# Patient Record
Sex: FEMALE | Race: WHITE | NOT HISPANIC OR LATINO | Employment: OTHER | URBAN - METROPOLITAN AREA
[De-identification: names, ages, dates, MRNs, and addresses within clinical notes are randomized per-mention and may not be internally consistent; named-entity substitution may affect disease eponyms.]

---

## 2023-02-22 ENCOUNTER — HOSPITAL ENCOUNTER (EMERGENCY)
Facility: CLINIC | Age: 28
Discharge: HOME | End: 2023-02-23
Attending: EMERGENCY MEDICINE | Admitting: EMERGENCY MEDICINE
Payer: COMMERCIAL

## 2023-02-22 DIAGNOSIS — Z48.89 ENCOUNTER FOR POST SURGICAL WOUND CHECK: ICD-10-CM

## 2023-02-22 PROCEDURE — 99283 EMERGENCY DEPT VISIT LOW MDM: CPT | Performed by: EMERGENCY MEDICINE

## 2023-02-22 PROCEDURE — 99282 EMERGENCY DEPT VISIT SF MDM: CPT | Performed by: EMERGENCY MEDICINE

## 2023-02-23 VITALS
HEIGHT: 62 IN | SYSTOLIC BLOOD PRESSURE: 139 MMHG | WEIGHT: 175 LBS | OXYGEN SATURATION: 96 % | TEMPERATURE: 99 F | BODY MASS INDEX: 32.2 KG/M2 | RESPIRATION RATE: 16 BRPM | HEART RATE: 90 BPM | DIASTOLIC BLOOD PRESSURE: 84 MMHG

## 2023-02-23 NOTE — DISCHARGE INSTRUCTIONS
Gently clean the surgical site twice a day with soap and water. Be sure to thoroughly dry the area after cleaning.    Monitor for any signs of infection which would include increasing redness, swelling, drainage, warmth, or increasing pain.  If such symptoms develop, please return for a wound check to assess for infection.

## 2023-02-23 NOTE — ED NOTES
There is along the middle of the  line a white spot. Bashir reports that she has been having the last 2 days white and clear discharge from the wound. There has been no reported fevers. No SOB. No reported Urinary symptoms. No abnormal vaginal discharge.

## 2023-02-24 ASSESSMENT — ENCOUNTER SYMPTOMS
FATIGUE: 0
APPETITE CHANGE: 0
LIGHT-HEADEDNESS: 0
VOMITING: 0
DIARRHEA: 0
SHORTNESS OF BREATH: 0
ABDOMINAL PAIN: 0
WOUND: 1
HEADACHES: 0
NAUSEA: 0
COUGH: 0
FEVER: 0
CHILLS: 0
CONSTIPATION: 0

## 2023-02-24 NOTE — ED PROVIDER NOTES
History     Chief Complaint   Patient presents with     Wound Infection     White drainage from  line.      HPI  Bashir Mcwilliams is a 28 year old female who is about 2 weeks postpartum from  presenting for for evaluation of possible surgical site infection.  Patient reports some abnormal itchiness and irritation around her incision site over the past day or so.  She noticed some slight drainage tonight and was concerned so came in for evaluation.  Denies any significant pain.  Denies fevers or chills.  Eating and drinking normally.  Normal bowel movements and urination.  Denies dysuria, urgency, or frequency.    Allergies:  Allergies   Allergen Reactions     Penicillins Hives       Problem List:    Patient Active Problem List    Diagnosis Date Noted     Foot injury 2013     Priority: Medium     Migraine 2013     Priority: Medium     Depressed 2013     Priority: Medium     Allergic rhinitis 2006     Priority: Medium     Problem list name updated by automated process. Provider to review       Attention deficit disorder 2006     Priority: Medium     Problem list name updated by automated process. Provider to review          Past Medical History:    Past Medical History:   Diagnosis Date     Anxiety      Depression      Migraine        Past Surgical History:    Past Surgical History:   Procedure Laterality Date     [facility] NONSPECIFIC PROCEDURE      liver hemagioma resection 3/ liver gone       Family History:    Family History   Problem Relation Age of Onset     Depression Mother         bipolar     Depression Brother         11 yr old with adhd     Asthma Mother      Diabetes Father      C.A.D. No family hx of      Hypertension No family hx of        Social History:  Marital Status:  Single [1]  Social History     Tobacco Use     Smoking status: Never   Substance Use Topics     Alcohol use: Yes     Comment: States last use was more than a year ago     Drug use: Yes      "Types: Marijuana     Comment: Last use more than a month ago        Medications:    citalopram (CELEXA) 10 MG tablet  STRATTERA 40 MG OR CAPS          Review of Systems   Constitutional: Negative for appetite change, chills, fatigue and fever.   Respiratory: Negative for cough and shortness of breath.    Cardiovascular: Negative for chest pain.   Gastrointestinal: Negative for abdominal pain, constipation, diarrhea, nausea and vomiting.        Pain in lower abdominal wall at site of incision   Genitourinary: Positive for vaginal bleeding (Slight spotting). Negative for decreased urine volume.   Skin: Positive for wound (Surgical incision lower abdominal wall).   Neurological: Negative for light-headedness and headaches.   All other systems reviewed and are negative.      Physical Exam   BP: 139/84  Pulse: 90  Temp: 99  F (37.2  C)  Resp: 16  Height: 157.5 cm (5' 2\")  Weight: 79.4 kg (175 lb)  SpO2: 96 %      Physical Exam  Vitals and nursing note reviewed.   HENT:      Head: Atraumatic.   Eyes:      Conjunctiva/sclera: Conjunctivae normal.   Cardiovascular:      Rate and Rhythm: Normal rate.   Pulmonary:      Effort: Pulmonary effort is normal.   Abdominal:       Skin:     General: Skin is warm and dry.      Capillary Refill: Capillary refill takes less than 2 seconds.   Neurological:      Mental Status: She is alert and oriented to person, place, and time.   Psychiatric:         Mood and Affect: Mood normal.             ED Course                 Procedures             No results found for this or any previous visit (from the past 24 hour(s)).    Medications - No data to display    Assessments & Plan (with Medical Decision Making)  28-year-old female who is about 2 weeks postpartum from a  presenting for evaluation of a possible wound infection.  Patient is well-appearing.  Afebrile.  Wound was cleaned by myself with a wet gauze.  Some small residual scab was wiped away and cleaned.  Incision appears well " healing with no evidence of abscess or cellulitis.  In the center of the incision there is a small area of increased moisture.  No active drainage.  I do not believe there is an active infection in the area.  Patient advised of the need to keep the wound clean and dry.  Plan to follow-up with her OB and return if she develops any worsening symptoms     I have reviewed the nursing notes.    I have reviewed the findings, diagnosis, plan and need for follow up with the patient.             Discharge Medication List as of 2/23/2023 12:31 AM          Final diagnoses:   Encounter for post surgical wound check       2/22/2023   [facility] EMERGENCY DEPT     Johnson, Wes Morales MD  02/24/23 3276

## 2023-05-29 ENCOUNTER — HOSPITAL ENCOUNTER (EMERGENCY)
Facility: CLINIC | Age: 28
Discharge: HOME | End: 2023-05-29
Attending: EMERGENCY MEDICINE | Admitting: EMERGENCY MEDICINE
Payer: COMMERCIAL

## 2023-05-29 ENCOUNTER — APPOINTMENT (OUTPATIENT)
Dept: GENERAL RADIOLOGY | Facility: CLINIC | Age: 28
End: 2023-05-29
Attending: EMERGENCY MEDICINE
Payer: COMMERCIAL

## 2023-05-29 VITALS
OXYGEN SATURATION: 100 % | TEMPERATURE: 98.2 F | WEIGHT: 258 LBS | BODY MASS INDEX: 45.71 KG/M2 | DIASTOLIC BLOOD PRESSURE: 80 MMHG | SYSTOLIC BLOOD PRESSURE: 125 MMHG | HEIGHT: 63 IN | RESPIRATION RATE: 18 BRPM | HEART RATE: 75 BPM

## 2023-05-29 DIAGNOSIS — M89.9 OSTEOCHONDRAL TALAR DOME LESION: ICD-10-CM

## 2023-05-29 DIAGNOSIS — S66.912A WRIST STRAIN, LEFT, INITIAL ENCOUNTER: ICD-10-CM
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
## 2023-05-29 DIAGNOSIS — M94.9 OSTEOCHONDRAL TALAR DOME LESION: ICD-10-CM

## 2023-05-29 DIAGNOSIS — S93.402A SPRAIN OF LEFT ANKLE, UNSPECIFIED LIGAMENT, INITIAL ENCOUNTER: ICD-10-CM

## 2023-05-29 DIAGNOSIS — S96.912A STRAIN OF LEFT FOOT, INITIAL ENCOUNTER: ICD-10-CM

## 2023-05-29 PROCEDURE — 73630 X-RAY EXAM OF FOOT: CPT | Mod: LT

## 2023-05-29 PROCEDURE — 99285 EMERGENCY DEPT VISIT HI MDM: CPT

## 2023-05-29 PROCEDURE — 73610 X-RAY EXAM OF ANKLE: CPT | Mod: LT

## 2023-05-29 PROCEDURE — 73110 X-RAY EXAM OF WRIST: CPT | Mod: LT

## 2023-05-29 PROCEDURE — 99284 EMERGENCY DEPT VISIT MOD MDM: CPT | Performed by: EMERGENCY MEDICINE

## 2023-05-29 ASSESSMENT — ACTIVITIES OF DAILY LIVING (ADL)
ADLS_ACUITY_SCORE: 35
ADLS_ACUITY_SCORE: 37

## 2023-05-29 NOTE — DISCHARGE INSTRUCTIONS
Return if symptoms worsen or new symptoms develop.  Follow-up with orthopedics next available.  Drink plenty of fluids.  Use crutches as needed do not weight bear weight if pain Continues take ibuprofen or Tylenol for pain elevate wrist and ankle is much as possible.  If increased pain numbness weakness occur please return for recheck.  They should call you to make an orthopedic appointment.

## 2023-05-29 NOTE — ED TRIAGE NOTES
Pt reports she fell on the [place], pt reports she thinks her right knee gave out, rolled her left ankle and caught herself with her left hand. Pt reports pain to left wrist, left knee and left ankle. Pt denies hitting her head or LOC at this time

## 2023-05-29 NOTE — ED PROVIDER NOTES
History     Chief Complaint   Patient presents with     Fall     Pt reports she fell on the [place], pt reports she thinks her right knee gave out, rolled her left ankle and caught herself with her left hand. Pt reports pain to left wrist, left knee and left ankle. Pt denies hitting her head or LOC at this time      HPI  Bashir Mcwilliams is a 28 year old female with past medical history significant for attention deficit disorder migraines foot injury depression who presents emergency department complaining of left wrist foot and ankle pain status post fall.  Patient states she was standing in her right leg suddenly gave out on her.  Patient has which she describes as a faulty knee this is happened in the past.  She fell down to the ground twisting her ankle and foot and landing on outstretched hands.  She did not hit her head she denies any neck or back pain has pain in her left wrist region left ankle and foot pain.  She is able to move the ankle but has pain pain is in the proximal dorsal foot and the lateral malleoli region of the ankle.  Has pain with movement of the left wrist but has able to move the wrist no obvious deformity noted no elbow or shoulder pain on the left.    Allergies:  Allergies   Allergen Reactions     No Clinical Screening - See Comments Difficulty breathing     Tide Sport Febreze     Penicillins Hives       Problem List:    Patient Active Problem List    Diagnosis Date Noted     Foot injury 01/19/2013     Priority: Medium     Migraine 01/17/2013     Priority: Medium     Depressed 01/16/2013     Priority: Medium     Allergic rhinitis 12/12/2006     Priority: Medium     Problem list name updated by automated process. Provider to review       Attention deficit disorder 12/12/2006     Priority: Medium     Problem list name updated by automated process. Provider to review          Past Medical History:    Past Medical History:   Diagnosis Date     Anxiety      Depression      Migraine   "      Past Surgical History:    Past Surgical History:   Procedure Laterality Date     [facility] NONSPECIFIC PROCEDURE      liver hemagioma resection 3/4 liver gone       Family History:    Family History   Problem Relation Age of Onset     Depression Mother         bipolar     Depression Brother         11 yr old with adhd     Asthma Mother      Diabetes Father      C.A.D. No family hx of      Hypertension No family hx of        Social History:  Marital Status:  Single [1]  Social History     Tobacco Use     Smoking status: Never   Substance Use Topics     Alcohol use: Yes     Comment: States last use was more than a year ago     Drug use: Yes     Types: Marijuana     Comment: Last use more than a month ago        Medications:    citalopram (CELEXA) 10 MG tablet  STRATTERA 40 MG OR CAPS          Review of Systems  All systems reviewed and other than pertinent positives and negatives in HPI all other systems are negative.  Physical Exam   BP: 125/80  Pulse: 75  Temp: 98.2  F (36.8  C)  Resp: 18  Height: 160 cm (5' 3\")  Weight: 117 kg (258 lb)  SpO2: 99 %      Physical Exam  Vitals and nursing note reviewed.   Constitutional:       General: She is not in acute distress.     Appearance: Normal appearance. She is not ill-appearing, toxic-appearing or diaphoretic.   HENT:      Head: Normocephalic and atraumatic.      Nose: Nose normal.      Mouth/Throat:      Mouth: Mucous membranes are moist.      Pharynx: Oropharynx is clear.   Eyes:      Conjunctiva/sclera: Conjunctivae normal.   Neck:      Comments: No posterior neck tenderness is present.  Cardiovascular:      Rate and Rhythm: Normal rate and regular rhythm.      Pulses: Normal pulses.      Heart sounds: Normal heart sounds. No murmur heard.  Pulmonary:      Effort: Pulmonary effort is normal.      Breath sounds: Normal breath sounds. No stridor. No wheezing or rhonchi.   Abdominal:      General: Abdomen is flat. There is no distension.      Palpations: Abdomen is soft. "      Tenderness: There is no abdominal tenderness. There is no right CVA tenderness or left CVA tenderness.   Musculoskeletal:      Comments: There is no midline back pain present.  There is tenderness to palpation over the left wrist region.  No significant swelling pain is mostly in the proximal wrist distal and the dorsum region palpation reproduces pain.  Patient able to flex and extend the fingers without difficulty sensation intact good capillary refill.  There is no hip tenderness present.  There is tenderness palpation of the lateral malleolus and dorsal proximal foot region laterally.  Palpation reproduces his pain.  No obvious laxity pulses sensation symmetrical good capillary refill is present   Skin:     General: Skin is warm and dry.      Capillary Refill: Capillary refill takes less than 2 seconds.      Findings: No rash.   Neurological:      General: No focal deficit present.      Mental Status: She is alert and oriented to person, place, and time.      Sensory: No sensory deficit.      Motor: No weakness.      Coordination: Coordination normal.   Psychiatric:         Mood and Affect: Mood normal.         ED Course                 Procedures              Critical Care time:  none               Results for orders placed or performed during the hospital encounter of 05/29/23 (from the past 24 hour(s))   Foot XR, G/E 3 views, left    Narrative    EXAM: XR FOOT LEFT G/E 3 VIEWS  LOCATION: [facility]  DATE/TIME: 5/29/2023 4:09 PM CDT    INDICATION: Fall, left proximal dorsal foot pain.  COMPARISON: None.      Impression    IMPRESSION:   1. Chronic-appearing medial corner talar dome osteochondral lesion.  2. Normal otherwise. No evidence of foot fracture or malalignment.   XR Wrist Left G/E 3 Views    Narrative    EXAM: XR WRIST LEFT G/E 3 VIEWS  LOCATION: [facility]  DATE/TIME: 5/29/2023 4:10 PM CDT    INDICATION: Fall left wrist pain.  COMPARISON:  None.      Impression    IMPRESSION: Normal joint spaces and alignment. No fracture.   XR Ankle Left G/E 3 Views    Narrative    EXAM: XR ANKLE LEFT G/E 3 VIEWS  LOCATION: [facility]  DATE/TIME: 5/29/2023 4:10 PM CDT    INDICATION: Fall, left ankle pain.  COMPARISON: None.      Impression    IMPRESSION: There is a moderate-sized chronic-appearing medial corner talar dome osteochondral lesion. MRI could help determine whether not this is unstable. The bones appear normal otherwise. Normal joint spacing and alignment. There is no evidence of   an acute or subacute fracture.       Medications - No data to display    Assessments & Plan (with Medical Decision Making) records were reviewed.  Past medical history allergies and medications were reviewed.  Office visit from 3/16/2023 was reviewed.  Due to the fall x-rays of the left wrist left ankle and foot were obtained.  I independently reviewed and interpreted the x-rays. I agrees with the radiologist findings.  Right wrist film revealed normal joint space and alignment no fracture seen.  Left ankle x-ray there is no evidence of acute or subacute fracture moderate size chronic appearing medial corner talar dome osteochondral lesion recommended further follow-up with possible MRI scan.  No evidence of foot fracture or malalignment are seen.  Findings discussed with patient she has having pain with ambulation and at this time I Britt place the patient in air splint and have her use crutches with weightbearing as tolerated.  I am going to have her her follow-up with orthopedics for further assessment of the talar lesion and need for MRI.  Patient feels comfortable with this plan at this time.  She will elevate and ice and take ibuprofen or Tylenol for pain if worsening pain numbness weakness she should return for recheck.     I have reviewed the nursing notes.    I have reviewed the findings, diagnosis, plan and need for follow up with the  patient.         Discharge Medication List as of 5/29/2023  5:17 PM          Final diagnoses:   Fall, initial encounter   Wrist strain, left, initial encounter   Sprain of left ankle, unspecified ligament, initial encounter   Strain of left foot, initial encounter   Osteochondral talar dome lesion       5/29/2023   [facility] EMERGENCY DEPT     Sanchez Amin MD  05/30/23 1148

## 2024-12-31 ENCOUNTER — HOSPITAL ENCOUNTER (EMERGENCY)
Facility: CLINIC | Age: 29
Discharge: HOME | End: 2024-12-31
Payer: COMMERCIAL

## 2024-12-31 VITALS
WEIGHT: 249 LBS | SYSTOLIC BLOOD PRESSURE: 124 MMHG | RESPIRATION RATE: 18 BRPM | HEART RATE: 96 BPM | DIASTOLIC BLOOD PRESSURE: 84 MMHG | BODY MASS INDEX: 44.11 KG/M2 | TEMPERATURE: 99.8 F | OXYGEN SATURATION: 100 %

## 2024-12-31 DIAGNOSIS — J35.8 TONSILLAR EXUDATE: Primary | ICD-10-CM

## 2024-12-31 PROBLEM — G89.29 CHRONIC RIGHT-SIDED THORACIC BACK PAIN: Status: ACTIVE | Noted: 2019-02-18

## 2024-12-31 PROBLEM — F31.81 BIPOLAR II DISORDER, MILD, DEPRESSED, WITH ANXIOUS DISTRESS (H): Status: ACTIVE | Noted: 2023-07-20

## 2024-12-31 PROBLEM — O09.90 SUPERVISION OF HIGH-RISK PREGNANCY: Status: ACTIVE | Noted: 2022-11-29

## 2024-12-31 PROBLEM — O13.9 GESTATIONAL HYPERTENSION AFFECTING SECOND PREGNANCY: Status: ACTIVE | Noted: 2023-01-10

## 2024-12-31 PROBLEM — F43.10 PTSD (POST-TRAUMATIC STRESS DISORDER): Status: ACTIVE | Noted: 2023-07-20

## 2024-12-31 PROBLEM — M54.6 CHRONIC RIGHT-SIDED THORACIC BACK PAIN: Status: ACTIVE | Noted: 2019-02-18

## 2024-12-31 PROBLEM — O99.212 MATERNAL MORBID OBESITY IN SECOND TRIMESTER, ANTEPARTUM (H): Status: ACTIVE | Noted: 2022-09-14

## 2024-12-31 PROBLEM — E66.01 MATERNAL MORBID OBESITY IN SECOND TRIMESTER, ANTEPARTUM (H): Status: ACTIVE | Noted: 2022-09-14

## 2024-12-31 LAB — S PYO DNA THROAT QL NAA+PROBE: NOT DETECTED

## 2024-12-31 PROCEDURE — 87651 STREP A DNA AMP PROBE: CPT

## 2024-12-31 PROCEDURE — 99283 EMERGENCY DEPT VISIT LOW MDM: CPT

## 2024-12-31 ASSESSMENT — ACTIVITIES OF DAILY LIVING (ADL): ADLS_ACUITY_SCORE: 41

## 2025-01-01 NOTE — ED TRIAGE NOTES
Pt comes in with white spots on her throat. States its either thrush or tonsillitis and that she did cough up a tonsil stone the other day.

## 2025-01-01 NOTE — ED PROVIDER NOTES
[facility]  Emergency Department Visit Note    PATIENT:  Bashir Mcwilliams     29 year old     female      [id number]    Chief complaint:  Chief Complaint   Patient presents with    Pharyngitis          History of present illness:  Patient is a 29 year old female with bipolar 2, GERD, PTSD presenting for evaluation of white coloration of left tonsil.    Had tonsil stones 1 week ago.  Her fiancé states he noticed the white coloration over her left tonsils yesterday, she noticed it this morning.  Nothing on the right side.  She does not feel like she is having significant throat pain, perhaps had some mild throat pain that improved with oral hydration in triage.  No fevers, cough, chest pain, dyspnea.      Review of Systems:  As in HPI above    /84   Pulse 96   Temp 99.8  F (37.7  C) (Tympanic)   Resp 18   Wt 112.9 kg (249 lb)   SpO2 100%   BMI 44.11 kg/m        Physical Exam:  Constitutional: laying in hospital bed, alert, oriented, and in no apparent distress  HEENT: normocephalic, atraumatic, sclerae anicteric, moist mucous membranes, and uvula midline, tongue not elevated, no sublingual firmness or tenderness.  Does have left tonsillar pillar exudates, no right-sided exudates.  TMs clear bilaterally.  Neck: able to fully range, no stridor, and no submandibular swelling, discoloration, or overlying skin change  Cardiovascular: regular rate and rhythm  Pulmonary: breathing comfortably on room air  Extremities/MSK: no cyanosis   Skin: warm, dry  Neurologic: moves all four extremities spontaneously and ambulates without assistance  Psychiatric: calm, appropriate      MDM:  Patient is a 29 year old female with above history presenting for evaluation of tonsillar exudate.    Vitals normal, afebrile. Exam reassuring, appears acutely well, left-sided tonsillar pillar exudate.    Differential includes tonsillolith.  Had group A strep swab negative in triage.  Viral pharyngitis possible though  not having note where the pain.  No evidence of deeper infectious process.    Recommended monitoring over the next few days.  Salt water gargling.  Acetaminophen Dibrom for pain and fevers as they arrive.  ED return precaution discussed event of new or worsening symptoms.  She expressed understanding agreement with this.    Disposition discharge. Remainder of ED course below.    ED COURSE:       Encounter Diagnoses:  Final diagnoses:   Tonsillar exudate       Final disposition: discharge    Fam Pena MD  12/31/2024  9:42 PM   Emergency Medicine  [facility]      Fam Pena MD  12/31/24 [id number]

## 2025-01-01 NOTE — DISCHARGE INSTRUCTIONS
You were seen in the emergency department today for white discoloration of your tonsil. We did tests including strep test that showed that you are negative for strep throat.  There are a couple of options as to what is causing your symptoms including tonsil stones as well as a viral sore throat infection.  Either way symptoms should get better with time.  You can continue salt water gargles to help with symptoms.    In the meantime, you can take acetaminophen (Tylenol) or ibuprofen for your fever and pain. You can alternate these every three hours as needed. So, for example, you could take acetaminophen at noon, then ibuprofen at 3pm, then acetaminophen again at 6pm, and so on. Do not take more Tylenol or ibuprofen than the daily maximum dose as indicated on the bottle.    Please follow up with your primary doctor as needed for ongoing evaluation and management of your symptoms.    Return to the emergency department with new or worsening symptoms that you find concerning.